# Patient Record
Sex: FEMALE | Race: WHITE | NOT HISPANIC OR LATINO | URBAN - METROPOLITAN AREA
[De-identification: names, ages, dates, MRNs, and addresses within clinical notes are randomized per-mention and may not be internally consistent; named-entity substitution may affect disease eponyms.]

---

## 2024-07-02 ENCOUNTER — TELEPHONE (OUTPATIENT)
Age: 39
End: 2024-07-02

## 2024-07-02 NOTE — TELEPHONE ENCOUNTER
Patient and her insurance called in regards to possibly scheduling a Rheumatology Consult with [facility], her insurance is in network. Patient is going to have her referral faxed to our practice today to Fax #[phone]. I advised her to call us back tomorrow to proceed in scheduling, or our office will reach out to her once received. Thank you

## 2024-09-16 ENCOUNTER — CONSULT (OUTPATIENT)
Dept: RHEUMATOLOGY | Facility: CLINIC | Age: 39
End: 2024-09-16
Payer: COMMERCIAL

## 2024-09-16 VITALS
WEIGHT: 137 LBS | SYSTOLIC BLOOD PRESSURE: 120 MMHG | DIASTOLIC BLOOD PRESSURE: 76 MMHG | HEIGHT: 62 IN | OXYGEN SATURATION: 100 % | HEART RATE: 83 BPM | BODY MASS INDEX: 25.21 KG/M2

## 2024-09-16 DIAGNOSIS — M25.542 ARTHRALGIA OF BOTH HANDS: Primary | ICD-10-CM

## 2024-09-16 DIAGNOSIS — M25.541 ARTHRALGIA OF BOTH HANDS: Primary | ICD-10-CM

## 2024-09-16 PROCEDURE — 99204 OFFICE O/P NEW MOD 45 MIN: CPT | Performed by: INTERNAL MEDICINE

## 2024-09-16 RX ORDER — ADAPALENE AND BENZOYL PEROXIDE 3; 25 MG/G; MG/G
GEL TOPICAL
COMMUNITY

## 2024-09-16 RX ORDER — TRETINOIN 0.25 MG/G
CREAM TOPICAL
COMMUNITY

## 2024-09-16 RX ORDER — PNV NO.95/FERROUS FUM/FOLIC AC 28MG-0.8MG
TABLET ORAL
COMMUNITY
Start: 2023-08-20

## 2024-09-16 RX ORDER — MAGNESIUM GLYCINATE 100 MG
CAPSULE ORAL
COMMUNITY

## 2024-09-16 RX ORDER — TRETINOIN 0.5 MG/G
CREAM TOPICAL
COMMUNITY

## 2024-09-16 RX ORDER — NORGESTIMATE AND ETHINYL ESTRADIOL
KIT
COMMUNITY

## 2024-09-16 NOTE — PROGRESS NOTES
"  This is a Rheumatology Consult seen at the request of Dr. Alvarenga      HPI: Ketty Tran is a 39 y/o female who presents for further evaluation low titer DWAYNE 1:40. She has past medical history Vitamin B 12 deficiency, iron deficiency anemia    Onset of symptoms > 1 year ago August 2023.     + facial numbness right side of her face. This is intermittent    \"Mostly happens when I sit down but not all the time\"    \"If I stand up and move around it goes away\"    She feels her symptoms have improved after B12 supplements    As part of w/u per neurology MRI brain was done. Per patient this was normal    Low titer DWAYNE 1:40    Chronic arthralgias b/l hands right >> left    Denies rashes, inflammatory bowel disease, psoriasis, Raynaud's, seizures or strokes, thrombotic events or pregnancy morbidity    PCP Dr. Yi Alvarenga     Neurologist: Sonia Peraza PA-C           --------------------------------------------------------------------------------------------------------        ROS:        All other ROS was reviewed and negative except as above         --------------------------------------------------------------------------------------------------------    Past Medical History    Vitamin B 12 deficiency         Past Surgical History    C section X 2      Family History    Mother unspecified arthritis          Social History    Social History     Tobacco Use    Smoking status: Never    Smokeless tobacco: Never   Substance Use Topics    Alcohol use: Yes     Comment: rare    Drug use: Never     Works in childcare     Allergies    Allergies   Allergen Reactions    Pseudoephedrine Dizziness, Nausea Only and Vomiting     Nausea, Vomiting         Medications    Current Outpatient Medications   Medication Instructions    Adapalene-Benzoyl Peroxide 0.3-2.5 % GEL Apply externally    Cyanocobalamin (B-12) 1000 MCG CAPS     Cyanocobalamin 1000 MCG SUBL 1 tablet, Sublingual    Ferrous Sulfate (IRON PO) " "1 tablet, Oral, Daily    Ferrous Sulfate (Iron) 325 (65 Fe) MG TABS     Magnesium Glycinate 100 MG CAPS     Prenatal MV-Min-Fe Fum-FA-DHA (PRENATAL 1 PO)     tretinoin (RETIN-A) 0.025 % cream Apply topically    tretinoin (RETIN-A) 0.05 % cream     Tri-Lo-Abida 0.18/0.215/0.25 MG-25 MCG per tablet           Physical Exam    /76   Pulse 83   Ht 5' 2\" (1.575 m)   Wt 62.1 kg (137 lb)   SpO2 100%   BMI 25.06 kg/m²     GEN: AAO, No apparent distress.  Patient is well developed.  HEENT:  Pupils are equal, round and reactive.  Sclera are clear.  Fundoscopic exam is normal.  External ears are without lesions.  Oral pharynx is clear of ulcers or other lesions.  MMM.   NECK:  Supple.  There is no adenopathy appreciable in anterior or posterior cervical chains or supraclavicularly.  JVP is normal.    HEART: Regular rate and rhythm.  There is no appreciable murmur, gallop or rub.  LUNGS: Clear to auscultation.  ABD:  Soft, without tenderness, rebound or guarding.  No appreciable organomegally.  NEURO: Speech and cognition are normal.  Strength is 5/5 throughout.  Tone is normal.  DTRs are 2/4 at the knees, ankles and elbows.  Gait is normal.  SKIN: There are no rashes or lesions    MUSCULOSKELETAL:   -Hands: normal  -Wrists: normal  -Elbows: normal  -Shoulders: normal  -Neck: normal  -Back: normal  -Hips: normal  -Knees: normal  -Ankles: normal  -Feet: normal      ________________________________________________________________________          Results Review    DWAYNE Titer & Pattern  Order: [id number]  Component  Ref Range & Units 5/1/24  1:07 PM   DWAYNE Titer  titer 1:40 High    Comment: A low level DWAYNE titer may be present in pre-clinical  autoimmune diseases and normal individuals.                  Reference Range                  <1:40        Negative                  1:40-1:80    Low Antibody Level                  >1:80        Elevated Antibody Level   DWAYNE Pattern Nuclear, Dense Fine Speckled Abnormal  "         Impressions and Plans:    Ketty Tran is a 37 y/o female with chronic ride sided facial numbness since August 2023    She feels her symptoms have improved after B12 supplements    As part of w/u per neurology MRI brain was done. Per patient this was normal    Low titer DWAYNE 1:40    Chronic arthralgias b/l hands right >> left    On exam no other s/s inflammatory arthritis/CTD    Low titer DWAYNE 1:40 noted (usually considered negative)    Check complete DWAYNE profile, RF, x-rays hands    Will review test results and f/u as needed        Thank you for involving me in this patient's care.        Porter Rubio MD  [facility]

## 2024-09-19 ENCOUNTER — TELEPHONE (OUTPATIENT)
Age: 39
End: 2024-09-19

## 2024-09-19 DIAGNOSIS — M25.542 ARTHRALGIA OF BOTH HANDS: Primary | ICD-10-CM

## 2024-09-19 DIAGNOSIS — M25.541 ARTHRALGIA OF BOTH HANDS: Primary | ICD-10-CM

## 2024-09-19 NOTE — TELEPHONE ENCOUNTER
Noemy from [facility] called to confirm the xray scripts because they have two left hand xrays when one should be right hand. She is asking if the new order for the right hand can be faxed to their office at [phone].      Please advise.

## 2024-10-07 LAB — HCV AB SER-ACNC: NORMAL

## 2024-11-04 ENCOUNTER — TELEPHONE (OUTPATIENT)
Age: 39
End: 2024-11-04